# Patient Record
Sex: MALE | Race: WHITE | NOT HISPANIC OR LATINO | Employment: FULL TIME | URBAN - METROPOLITAN AREA
[De-identification: names, ages, dates, MRNs, and addresses within clinical notes are randomized per-mention and may not be internally consistent; named-entity substitution may affect disease eponyms.]

---

## 2022-05-11 ENCOUNTER — NURSE TRIAGE (OUTPATIENT)
Dept: FAMILY MEDICINE | Facility: CLINIC | Age: 32
End: 2022-05-11
Payer: COMMERCIAL

## 2022-05-11 ENCOUNTER — OFFICE VISIT (OUTPATIENT)
Dept: FAMILY MEDICINE | Facility: CLINIC | Age: 32
End: 2022-05-11
Payer: COMMERCIAL

## 2022-05-11 VITALS
OXYGEN SATURATION: 99 % | RESPIRATION RATE: 16 BRPM | DIASTOLIC BLOOD PRESSURE: 72 MMHG | WEIGHT: 167.5 LBS | SYSTOLIC BLOOD PRESSURE: 134 MMHG | TEMPERATURE: 98.8 F | HEART RATE: 89 BPM | BODY MASS INDEX: 26.29 KG/M2 | HEIGHT: 67 IN

## 2022-05-11 DIAGNOSIS — F41.1 GAD (GENERALIZED ANXIETY DISORDER): Primary | ICD-10-CM

## 2022-05-11 PROCEDURE — 99203 OFFICE O/P NEW LOW 30 MIN: CPT | Performed by: FAMILY MEDICINE

## 2022-05-11 ASSESSMENT — PATIENT HEALTH QUESTIONNAIRE - PHQ9
SUM OF ALL RESPONSES TO PHQ QUESTIONS 1-9: 21
10. IF YOU CHECKED OFF ANY PROBLEMS, HOW DIFFICULT HAVE THESE PROBLEMS MADE IT FOR YOU TO DO YOUR WORK, TAKE CARE OF THINGS AT HOME, OR GET ALONG WITH OTHER PEOPLE: VERY DIFFICULT
SUM OF ALL RESPONSES TO PHQ QUESTIONS 1-9: 21

## 2022-05-11 ASSESSMENT — ANXIETY QUESTIONNAIRES
7. FEELING AFRAID AS IF SOMETHING AWFUL MIGHT HAPPEN: MORE THAN HALF THE DAYS
8. IF YOU CHECKED OFF ANY PROBLEMS, HOW DIFFICULT HAVE THESE MADE IT FOR YOU TO DO YOUR WORK, TAKE CARE OF THINGS AT HOME, OR GET ALONG WITH OTHER PEOPLE?: VERY DIFFICULT
3. WORRYING TOO MUCH ABOUT DIFFERENT THINGS: NEARLY EVERY DAY
GAD7 TOTAL SCORE: 20
6. BECOMING EASILY ANNOYED OR IRRITABLE: NEARLY EVERY DAY
1. FEELING NERVOUS, ANXIOUS, OR ON EDGE: NEARLY EVERY DAY
5. BEING SO RESTLESS THAT IT IS HARD TO SIT STILL: NEARLY EVERY DAY
7. FEELING AFRAID AS IF SOMETHING AWFUL MIGHT HAPPEN: MORE THAN HALF THE DAYS
GAD7 TOTAL SCORE: 20
4. TROUBLE RELAXING: NEARLY EVERY DAY
2. NOT BEING ABLE TO STOP OR CONTROL WORRYING: NEARLY EVERY DAY
GAD7 TOTAL SCORE: 20

## 2022-05-11 NOTE — TELEPHONE ENCOUNTER
"Patient is looking to get in with a provider to discuss INOCENTE documentation and mental health.     No primary care provider established at this time but would like to establish in [place].     Patient currently has anxiety and depression.    States he is not on medication currently. \"INOCENTE is working very well.\"    Seen therapist back in 2020 and got an INOCENTE letter back then for his current dog. Letter worked great up until now.     States he is trying to move into a house but will not accept the form from therapist. States they need to fax over \"their own form for provider to complete and fax back.\"    When asked if he has thoughts of self-harm or harm to others he responds \"no just punching things but not thoughts like that.\"     States his anxiety is at an all time high and has an appointment for 5/19 but cannot wait that long. Will keep visit for 5/19 for follow-up if needed.     SEE IN OFFICE TODAY:   * You need to be examined today. Let me give you an appointment.  * IF NO AVAILABLE APPOINTMENTS:  You need to be seen in the Urgent Care Center. Go to the one at nearest location. Go there today. A nearby Urgent Care Center is often a good source of care. Another choice is to go to the Emergency Department.    Advised patient be seen today. If patient has any thought of self-harm or harm to others then he needs to go to emergency room immediately.    Patient states understanding and was scheduled with ALBERTINA Elaine, RN  [facility]  May 11, 2022    Reason for Disposition    Patient sounds very upset or troubled to the triager    Additional Information    Negative: Severe difficulty breathing (e.g., struggling for each breath, speaks in single words)    Negative: Bluish (or gray) lips or face    Negative: Difficult to awaken or acting confused  (e.g., disoriented, slurred speech)    Negative: Hysterical or combative behavior    Negative: Sounds like a life-threatening emergency to " the triager    Negative: Chest pain    Negative: Palpitations, skipped heart beat, or rapid heart beat    Negative: Cough is main symptom    Negative: Suicide thoughts, threats, attempts, or questions    Negative: Depression is main problem or symptom (e.g., feelings of sadness or hopelessness)    Negative: Difficulty breathing and persists > 10 minutes and not relieved by reassurance provided by triager    Negative: Lightheadedness or dizziness and persists > 10 minutes and not relieved by reassurance provided by triager    Negative: Alcohol or drug abuse, known or suspected, and feeling very shaky (i.e., visible tremors of hands)    Negative: Patient sounds very sick or weak to the triager    Protocols used: ANXIETY AND PANIC ATTACK-A-OH

## 2022-05-11 NOTE — PROGRESS NOTES
"Assessment & Plan   1. SHILOH (generalized anxiety disorder): Symptoms of anxiety disorder.  Previous self-harm.  Likely some underlying component of depression as well.  Question bipolar given impulsiveness, possible episodes of gisela.  Either way declines medication therapy.  Recommend referral for psychology.  Discussed I would be willing to fill out a form stating he has an emotional support animal which she uses for anxiety; however, I cannot state this is a medical necessity nor that it is technically a \"service animal \".  Discussed that service animals typically referencing a specific medical needs such as diabetes hypoglycemia, seizure disorder, or physical disability.  Patient states he will bring in documentation regarding his dog as well as have the necessary forms sent over from his place of future residence for us to go over together.  No SI.  Believe he is able to be treated safely outpatient.  Discussed mechanisms for combating automatic negative thoughts, as well as exercises for stress/anxiety reduction including deep breathing exercises, sequential muscle contraction, and stimulating the dive reflex etc.  - Adult Mental Health  Referral; Future      Return in about 4 weeks (around 6/8/2022), or if symptoms worsen or fail to improve.    Efren Mckeon MD  [facility]    This chart is completed utilizing dictation software; typos and/or incorrect word substitutions may unintentionally occur.      Jannie Story is a 32 year old who presents for the following health issues:      History of Present Illness       Mental Health Follow-up:  Patient presents to follow-up on Depression & Anxiety.Patient's depression since last visit has been:  Worse  The patient is not having other symptoms associated with depression.  Patient's anxiety since last visit has been:  Worse  The patient is not having other symptoms associated with anxiety.  Any significant life " events: job concerns, financial concerns, housing concerns, grief or loss and health concerns  Patient is feeling anxious or having panic attacks.  Patient has no concerns about alcohol or drug use.       Today's PHQ-9         PHQ-9 Total Score: 21  PHQ-9 Q9 Thoughts of better off dead/self-harm past 2 weeks :   Several days  Thoughts of suicide or self harm: (P) Yes  Self-harm Plan:   (P) No  Self-harm Action:     (P) No  Safety concerns for self or others: (P) No    How difficult have these problems made it for you to do your work, take care of things at home, or get along with other people: Very difficult    Today's SHILOH-7 Score: 20    Reason for visit:  Depression anxiety and sal  Symptom onset:  More than a month  Symptoms include:  Easily agitated, always worried, anxiety high, panic attacks, headaches, always feeling down or upset  Symptom intensity:  Moderate  Symptom progression:  Staying the same  Had these symptoms before:  Yes  Has tried/received treatment for these symptoms:  Yes  Previous treatment was successful:  No  What makes it worse:  Having to take medication, lots of people, heavy scheduled days  What makes it better:  My puppy. Constantly moving or keeping brain busy.    He eats 0-1 servings of fruits and vegetables daily.He consumes 4 sweetened beverage(s) daily.He exercises with enough effort to increase his heart rate 10 to 19 minutes per day.  He exercises with enough effort to increase his heart rate 3 or less days per week.   He is taking medications regularly.     Patient notes experiencing anxiety-like symptoms since 2015.  He has never taken medication specifically for anxiety.  He has dabbled with marijuana.  He denies alcohol use.  He is a daily smoker.    He is strongly against medication therapy.    He is seeking attention today as he is moving in with his girlfriend and the landlord requests forms filled out before he can have a dog at the house/residence.    He considers this to  "be an emotional service animal.  He states he has documentation to eat for this.    States he was previously diagnosed with ADHD as well and did not tolerate Adderall.  Does not feel this interferes with his job.    His dog is a pit bull lab mix.    His anxiety symptoms include excessive worry, fidgetiness, feeling on edge, easily irritable.  He does sometimes have explosive anger and lashing out at inanimate objects.  He has no thoughts of self-harm at this time.  He has previously attempted self-harm in 2016.    He does note episodes where he will go approximately 2 days without sleep.  He has impulsively driven with his sister [place] state lines.  He does not endorse gambling.  He does not endorse any hallucinations.    He does endorse stressors at work; however, this has improved given a coworker he did not get along with transferred.    He has no other concerns today.  He would be okay with therapy.    Review of Systems   Constitutional, HEENT, cardiovascular, pulmonary, GI, , musculoskeletal, neuro, skin, endocrine and psych systems are negative, except as otherwise noted.      Objective    /72   Pulse 89   Temp 98.8  F (37.1  C) (Temporal)   Resp 16   Ht 1.708 m (5' 7.24\")   Wt 76 kg (167 lb 8 oz)   SpO2 99%   BMI 26.04 kg/m    Body mass index is 26.04 kg/m .  Physical Exam   General: Appears well and in no acute distress.  Cardiovascular: Regular rate and rhythm, normal S1 and S2 without murmur. No extra heartsounds or friction rub. Radial pulses present and equal bilaterally.  Respiratory: Lungs clear to auscultation bilaterally. No wheezing or crackles. No prolonged expiration. Symmetrical chest rise.  Musculoskeletal: No gross extremity deformities. No peripheral edema. Normal muscle bulk.    Labs: pending        "

## 2022-05-12 ASSESSMENT — ANXIETY QUESTIONNAIRES: GAD7 TOTAL SCORE: 20
